# Patient Record
Sex: FEMALE | Race: AMERICAN INDIAN OR ALASKA NATIVE | NOT HISPANIC OR LATINO | Employment: FULL TIME | URBAN - METROPOLITAN AREA
[De-identification: names, ages, dates, MRNs, and addresses within clinical notes are randomized per-mention and may not be internally consistent; named-entity substitution may affect disease eponyms.]

---

## 2017-04-14 ENCOUNTER — TELEPHONE (OUTPATIENT)
Dept: OTHER | Facility: CLINIC | Age: 22
End: 2017-04-14

## 2017-04-14 NOTE — TELEPHONE ENCOUNTER
4/14/2017    Call Regarding Onboarding are Choices     Attempt 1    Message on voicemail     Comments: NO DEP      Outreach   CC

## 2017-06-02 NOTE — TELEPHONE ENCOUNTER
Kyra Ferrer     Last Written Prescription Date: HIstorical  Last Fill Quantity: /,  # refills: /   [place] [facility] [place] [facility] or [facility] prescribing provider: No FP  encounter

## 2017-06-05 RX ORDER — NORETHINDRONE ACETATE/ETHINYL ESTRADIOL AND FERROUS FUMARATE 1.5-30(21)
KIT ORAL
Qty: 84 TABLET | Refills: 2 | Status: SHIPPED | OUTPATIENT
Start: 2017-06-05 | End: 2017-06-05

## 2017-06-05 NOTE — TELEPHONE ENCOUNTER
Patient called and has already filled this with Dr. Jackson out of [facility] [employer] today, so patient states to disregard request. While attempting to remove, ordered in error, have discontinued the medication.  Jh

## 2022-12-29 ENCOUNTER — APPOINTMENT (OUTPATIENT)
Dept: ULTRASOUND IMAGING | Facility: CLINIC | Age: 27
End: 2022-12-29
Attending: EMERGENCY MEDICINE
Payer: COMMERCIAL

## 2022-12-29 ENCOUNTER — HOSPITAL ENCOUNTER (EMERGENCY)
Facility: CLINIC | Age: 27
Discharge: HOME | End: 2022-12-29
Attending: EMERGENCY MEDICINE | Admitting: EMERGENCY MEDICINE
Payer: COMMERCIAL

## 2022-12-29 ENCOUNTER — APPOINTMENT (OUTPATIENT)
Dept: CT IMAGING | Facility: CLINIC | Age: 27
End: 2022-12-29
Attending: EMERGENCY MEDICINE
Payer: COMMERCIAL

## 2022-12-29 VITALS
HEART RATE: 90 BPM | OXYGEN SATURATION: 100 % | DIASTOLIC BLOOD PRESSURE: 83 MMHG | RESPIRATION RATE: 16 BRPM | TEMPERATURE: 97 F | SYSTOLIC BLOOD PRESSURE: 133 MMHG

## 2022-12-29 DIAGNOSIS — N20.0 NEPHROLITHIASIS: ICD-10-CM

## 2022-12-29 DIAGNOSIS — R10.31 RIGHT LOWER QUADRANT PAIN: ICD-10-CM

## 2022-12-29 DIAGNOSIS — E87.6 HYPOKALEMIA: ICD-10-CM

## 2022-12-29 DIAGNOSIS — R10.9 RIGHT FLANK PAIN: ICD-10-CM

## 2022-12-29 LAB
ALBUMIN UR-MCNC: NEGATIVE MG/DL
ANION GAP SERPL CALCULATED.3IONS-SCNC: 13 MMOL/L (ref 7–15)
APPEARANCE UR: CLEAR
BACTERIA #/AREA URNS HPF: ABNORMAL /HPF
BASOPHILS # BLD AUTO: 0.1 10E3/UL (ref 0–0.2)
BASOPHILS NFR BLD AUTO: 1 %
BILIRUB UR QL STRIP: NEGATIVE
BUN SERPL-MCNC: 8.9 MG/DL (ref 6–20)
CALCIUM SERPL-MCNC: 8.9 MG/DL (ref 8.6–10)
CHLORIDE SERPL-SCNC: 103 MMOL/L (ref 98–107)
COLOR UR AUTO: ABNORMAL
CREAT SERPL-MCNC: 0.84 MG/DL (ref 0.51–0.95)
DEPRECATED HCO3 PLAS-SCNC: 23 MMOL/L (ref 22–29)
EOSINOPHIL # BLD AUTO: 0.1 10E3/UL (ref 0–0.7)
EOSINOPHIL NFR BLD AUTO: 1 %
ERYTHROCYTE [DISTWIDTH] IN BLOOD BY AUTOMATED COUNT: 11.9 % (ref 10–15)
GFR SERPL CREATININE-BSD FRML MDRD: >90 ML/MIN/1.73M2
GLUCOSE SERPL-MCNC: 122 MG/DL (ref 70–99)
GLUCOSE UR STRIP-MCNC: NEGATIVE MG/DL
HCG SERPL QL: NEGATIVE
HCT VFR BLD AUTO: 35.8 % (ref 35–47)
HGB BLD-MCNC: 11.8 G/DL (ref 11.7–15.7)
HGB UR QL STRIP: NEGATIVE
HOLD SPECIMEN: NORMAL
IMM GRANULOCYTES # BLD: 0 10E3/UL
IMM GRANULOCYTES NFR BLD: 0 %
KETONES UR STRIP-MCNC: 60 MG/DL
LEUKOCYTE ESTERASE UR QL STRIP: NEGATIVE
LYMPHOCYTES # BLD AUTO: 2.4 10E3/UL (ref 0.8–5.3)
LYMPHOCYTES NFR BLD AUTO: 35 %
MCH RBC QN AUTO: 29.7 PG (ref 26.5–33)
MCHC RBC AUTO-ENTMCNC: 33 G/DL (ref 31.5–36.5)
MCV RBC AUTO: 90 FL (ref 78–100)
MONOCYTES # BLD AUTO: 0.5 10E3/UL (ref 0–1.3)
MONOCYTES NFR BLD AUTO: 7 %
MUCOUS THREADS #/AREA URNS LPF: PRESENT /LPF
NEUTROPHILS # BLD AUTO: 3.7 10E3/UL (ref 1.6–8.3)
NEUTROPHILS NFR BLD AUTO: 56 %
NITRATE UR QL: NEGATIVE
NRBC # BLD AUTO: 0 10E3/UL
NRBC BLD AUTO-RTO: 0 /100
PH UR STRIP: 5.5 [PH] (ref 5–7)
PLATELET # BLD AUTO: 292 10E3/UL (ref 150–450)
POTASSIUM SERPL-SCNC: 3.1 MMOL/L (ref 3.4–5.3)
RBC # BLD AUTO: 3.97 10E6/UL (ref 3.8–5.2)
RBC URINE: 1 /HPF
SODIUM SERPL-SCNC: 139 MMOL/L (ref 136–145)
SP GR UR STRIP: 1.02 (ref 1–1.03)
SQUAMOUS EPITHELIAL: 1 /HPF
UROBILINOGEN UR STRIP-MCNC: NORMAL MG/DL
WBC # BLD AUTO: 6.7 10E3/UL (ref 4–11)
WBC URINE: 2 /HPF

## 2022-12-29 PROCEDURE — 80048 BASIC METABOLIC PNL TOTAL CA: CPT | Performed by: EMERGENCY MEDICINE

## 2022-12-29 PROCEDURE — 250N000011 HC RX IP 250 OP 636: Performed by: EMERGENCY MEDICINE

## 2022-12-29 PROCEDURE — 81001 URINALYSIS AUTO W/SCOPE: CPT | Performed by: EMERGENCY MEDICINE

## 2022-12-29 PROCEDURE — 74176 CT ABD & PELVIS W/O CONTRAST: CPT

## 2022-12-29 PROCEDURE — 84703 CHORIONIC GONADOTROPIN ASSAY: CPT | Performed by: EMERGENCY MEDICINE

## 2022-12-29 PROCEDURE — 96361 HYDRATE IV INFUSION ADD-ON: CPT

## 2022-12-29 PROCEDURE — 96375 TX/PRO/DX INJ NEW DRUG ADDON: CPT

## 2022-12-29 PROCEDURE — 85025 COMPLETE CBC W/AUTO DIFF WBC: CPT | Performed by: EMERGENCY MEDICINE

## 2022-12-29 PROCEDURE — 258N000003 HC RX IP 258 OP 636: Performed by: EMERGENCY MEDICINE

## 2022-12-29 PROCEDURE — 76830 TRANSVAGINAL US NON-OB: CPT

## 2022-12-29 PROCEDURE — 36415 COLL VENOUS BLD VENIPUNCTURE: CPT | Performed by: EMERGENCY MEDICINE

## 2022-12-29 PROCEDURE — 96374 THER/PROPH/DIAG INJ IV PUSH: CPT

## 2022-12-29 PROCEDURE — 250N000013 HC RX MED GY IP 250 OP 250 PS 637: Performed by: EMERGENCY MEDICINE

## 2022-12-29 PROCEDURE — 99285 EMERGENCY DEPT VISIT HI MDM: CPT | Mod: 25

## 2022-12-29 RX ORDER — POTASSIUM CHLORIDE 1500 MG/1
40 TABLET, EXTENDED RELEASE ORAL ONCE
Status: COMPLETED | OUTPATIENT
Start: 2022-12-29 | End: 2022-12-29

## 2022-12-29 RX ORDER — ONDANSETRON 2 MG/ML
4 INJECTION INTRAMUSCULAR; INTRAVENOUS ONCE
Status: COMPLETED | OUTPATIENT
Start: 2022-12-29 | End: 2022-12-29

## 2022-12-29 RX ORDER — KETOROLAC TROMETHAMINE 15 MG/ML
15 INJECTION, SOLUTION INTRAMUSCULAR; INTRAVENOUS ONCE
Status: COMPLETED | OUTPATIENT
Start: 2022-12-29 | End: 2022-12-29

## 2022-12-29 RX ADMIN — POTASSIUM CHLORIDE 40 MEQ: 1500 TABLET, EXTENDED RELEASE ORAL at 09:44

## 2022-12-29 RX ADMIN — ONDANSETRON 4 MG: 2 INJECTION INTRAMUSCULAR; INTRAVENOUS at 07:30

## 2022-12-29 RX ADMIN — KETOROLAC TROMETHAMINE 15 MG: 15 INJECTION, SOLUTION INTRAMUSCULAR; INTRAVENOUS at 08:30

## 2022-12-29 RX ADMIN — SODIUM CHLORIDE 1000 ML: 9 INJECTION, SOLUTION INTRAVENOUS at 09:44

## 2022-12-29 ASSESSMENT — ENCOUNTER SYMPTOMS
FEVER: 0
NAUSEA: 1
DYSURIA: 0
VOMITING: 0
FLANK PAIN: 1
HEMATURIA: 0

## 2022-12-29 ASSESSMENT — ACTIVITIES OF DAILY LIVING (ADL)
ADLS_ACUITY_SCORE: 35
ADLS_ACUITY_SCORE: 35

## 2022-12-29 NOTE — ED PROVIDER NOTES
History   Chief Complaint:  Flank Pain and Back Pain     The history is provided by the patient.      aMrcela Francis is a 27 year old female who presents with flank pain and back pain. Marcela woke this morning and urinated and defecated without issue. Afterwards she developed right low back and flank pain which has progressively worsened and not improved by a second bowel movement. She has urinary frequency without dysuria or hematuria. She denies vomiting despite nausea and has had no fever.     Review of Systems   Constitutional: Negative for fever.   Gastrointestinal: Positive for nausea. Negative for abdominal pain, blood in stool, constipation, diarrhea and vomiting.   Genitourinary: Positive for flank pain (right sided) and frequency. Negative for difficulty urinating, dysuria and hematuria.   Musculoskeletal: Positive for back pain (right).   All other systems reviewed and are negative.    Allergies:  Topamax    Medications:  OCPs    Past Medical History:     HSV-1 infection  Chlamydia     Family History:    Father: Lupus  Mother: Diabetes, genitourinary disease    Social History:  Patient accompanied by her mother.     Physical Exam     Patient Vitals for the past 24 hrs:   BP Temp Temp src Pulse Resp SpO2   12/29/22 1030 127/88 -- -- 94 16 98 %   12/29/22 1015 130/80 -- -- 85 16 100 %   12/29/22 1000 120/80 -- -- 82 -- 99 %   12/29/22 0945 128/85 -- -- 104 -- 100 %   12/29/22 0930 (!) 130/91 -- -- 88 -- --   12/29/22 0721 113/71 97  F (36.1  C) Temporal 77 17 99 %       Physical Exam  General: Well-developed and well-nourished. Well appearing young  woman. Cooperative.  Head:  Atraumatic.  Eyes:  Conjunctivae, lids, and sclerae are normal.  Neck:  Supple. Normal range of motion.  CV:  Regular rate and rhythm. Normal heart sounds with no murmurs, rubs, or gallops detected.  Resp:  No respiratory distress. Clear to auscultation bilaterally without decreased breath sounds, wheezing, rales, or  rhonchi.  GI:  Soft. Non-distended. Tenderness to palpation in the right lower quadrant. No CVA tenderness.     MS:  Normal ROM.   Skin:  Warm. Non-diaphoretic. No pallor.  Neuro:  Awake. A&Ox3. Normal strength.  Psych: Normal mood and affect. Normal speech.  Vitals reviewed.    Emergency Department Course   Imaging:  US Pelvis Cmplt w Transvag & Doppler LmtPel Duplex Limited   Final Result   IMPRESSION:   1.  Unremarkable pelvic ultrasound with Doppler.   2.  No convincing sonographic evidence for ovarian torsion.         ABDI LITTLE MD            SYSTEM ID:  [id number]      Abd/pelvis CT no contrast - Stone Protocol   Final Result   IMPRESSION:    1.  A few small nonobstructing stones in the right kidney measures 0.5   cm or smaller.   2.  No ureteral calculi or evidence for urinary obstruction.       ABDI LITTLE MD            SYSTEM ID:  [id number]        Report per radiology    Laboratory:  Labs Ordered and Resulted from Time of ED Arrival to Time of ED Departure   BASIC METABOLIC PANEL - Abnormal       Result Value    Sodium 139      Potassium 3.1 (*)     Chloride 103      Carbon Dioxide (CO2) 23      Anion Gap 13      Urea Nitrogen 8.9      Creatinine 0.84      Calcium 8.9      Glucose 122 (*)     GFR Estimate >90     ROUTINE UA WITH MICROSCOPIC REFLEX TO CULTURE - Abnormal    Color Urine Light Yellow      Appearance Urine Clear      Glucose Urine Negative      Bilirubin Urine Negative      Ketones Urine 60 (*)     Specific Gravity Urine 1.021      Blood Urine Negative      pH Urine 5.5      Protein Albumin Urine Negative      Urobilinogen Urine Normal      Nitrite Urine Negative      Leukocyte Esterase Urine Negative      Bacteria Urine Few (*)     Mucus Urine Present (*)     RBC Urine 1      WBC Urine 2      Squamous Epithelials Urine 1     HCG QUALITATIVE PREGNANCY - Normal    hCG Serum Qualitative Negative     CBC WITH PLATELETS AND DIFFERENTIAL    WBC Count 6.7      RBC Count 3.97      Hemoglobin  11.8      Hematocrit 35.8      MCV 90      MCH 29.7      MCHC 33.0      RDW 11.9      Platelet Count 292      % Neutrophils 56      % Lymphocytes 35      % Monocytes 7      % Eosinophils 1      % Basophils 1      % Immature Granulocytes 0      NRBCs per 100 WBC 0      Absolute Neutrophils 3.7      Absolute Lymphocytes 2.4      Absolute Monocytes 0.5      Absolute Eosinophils 0.1      Absolute Basophils 0.1      Absolute Immature Granulocytes 0.0      Absolute NRBCs 0.0       Emergency Department Course:  Reviewed:  I reviewed nursing notes, vitals, past medical history, and Care Everywhere.    Assessments:  0930 I obtained history and examined the patient as noted above.   1112 I rechecked the patient and explained findings. She is feeling overall improved but still has tenderness. She is agreeable to pelvic ultrasound.    Interventions:  0730 Zofran 4 mg IV  0830 Toradol 15 mg IV  0944 1L NS bolus IV  0944 KCl 40 mEq PO    Disposition:  Discharged.    Impression & Plan   Medical Decision Making:  Marcela is a 27 year old woman presenting with right lower back/flank pain that started this morning after she urinated and defecated.  She describes urinary frequency and nausea but no other symptoms.  On exam she has no CVA tenderness but does have tenderness to palpation in the right lower quadrant.  Differential is broad but certainly includes ureterolithiasis for which she was sent for a noncontrast enhanced CT.  This reveals nephrolithiasis without evidence of ureterolithiasis.  On repeat evaluation she still has some tenderness in the right lower quadrant and was agreeable to pelvic ultrasound as appendix was normal on CT as well.  Fortunately, this is also unremarkable including no evidence of ovarian torsion.  Laboratory studies do not reveal a cause for her pain including no UTI or pregnancy.  She has mild hypokalemia to 3.1 which was repleted with oral potassium.  She did feel overall improved with Zofran, Toradol,  and IV fluids and is comfortable with plan for discharge.  It is certainly possible that she may have passed a kidney stone although typically we see stigmata of this on CT.  Her back/flank pain could also be musculoskeletal in etiology.  Either way, she is appropriate for discharge and I recommended she use Tylenol or ibuprofen as needed for residual pain and to follow-up with primary care to ensure she is still doing well.  We discussed indications for return and increasing dietary potassium.  All her and her mother's questions were answered.  Amenable to discharge.    Diagnosis:    ICD-10-CM    1. Right flank pain  R10.9       2. Right lower quadrant pain  R10.31       3. Hypokalemia  E87.6       4. Nephrolithiasis  N20.0           Discharge Medications:  Discharge Medication List as of 12/29/2022  1:06 PM          Scribe Disclosure:  I, Agnieszka Winston, am serving as a scribe at 10:31 AM on 12/29/2022 to document services personally performed by Litzy Wilson MD based on my observations and the provider's statements to me.          Litzy Wilson MD  01/15/23 2103       Litzy Wilson MD  01/15/23 2105

## 2022-12-29 NOTE — ED TRIAGE NOTES
Sudden onset of right lower back pain after urinating this AM, endorses sensation of urgency and frequency this AM as well. Denies pain with urination. VSS on RA.

## 2022-12-29 NOTE — DISCHARGE INSTRUCTIONS
Tylenol or ibuprofen as needed for residual pain.  Follow up with primary care.  Return if you have recurrence of severe pain, vomiting, fever, or any other concerns.  Increase dietary potassium (banana, spinach, baked potato, etc).

## 2023-01-15 ENCOUNTER — HEALTH MAINTENANCE LETTER (OUTPATIENT)
Age: 28
End: 2023-01-15

## 2023-01-15 ASSESSMENT — ENCOUNTER SYMPTOMS
DIFFICULTY URINATING: 0
BACK PAIN: 1
BLOOD IN STOOL: 0
FREQUENCY: 1
DIARRHEA: 0
ABDOMINAL PAIN: 0
CONSTIPATION: 0

## 2023-10-01 ENCOUNTER — HEALTH MAINTENANCE LETTER (OUTPATIENT)
Age: 28
End: 2023-10-01

## 2024-11-24 ENCOUNTER — HEALTH MAINTENANCE LETTER (OUTPATIENT)
Age: 29
End: 2024-11-24